# Patient Record
Sex: FEMALE | Race: WHITE
[De-identification: names, ages, dates, MRNs, and addresses within clinical notes are randomized per-mention and may not be internally consistent; named-entity substitution may affect disease eponyms.]

---

## 2019-01-03 ENCOUNTER — HOSPITAL ENCOUNTER (EMERGENCY)
Dept: HOSPITAL 56 - MW.ED | Age: 49
Discharge: HOME | End: 2019-01-03
Payer: MEDICARE

## 2019-01-03 VITALS — DIASTOLIC BLOOD PRESSURE: 95 MMHG | SYSTOLIC BLOOD PRESSURE: 150 MMHG

## 2019-01-03 DIAGNOSIS — Z88.6: ICD-10-CM

## 2019-01-03 DIAGNOSIS — F10.120: Primary | ICD-10-CM

## 2019-01-03 DIAGNOSIS — I10: ICD-10-CM

## 2019-01-03 DIAGNOSIS — E10.9: ICD-10-CM

## 2019-01-03 NOTE — EDM.PDOC
ED HPI GENERAL MEDICAL PROBLEM





- General


Chief Complaint: Drug or Alcohol Abuse


Stated Complaint: AMB.


Time Seen by Provider: 01/03/19 15:32





- History of Present Illness


INITIAL COMMENTS - FREE TEXT/NARRATIVE: 





HISTORY AND PHYSICAL:





History of present illness:


The patient is a 48-year-old female who is a type I diabetic who usually uses 

an insulin pump and comes via EMS after the family called EMS for the patient 

acting not like herself after drinking large amounts of alcohol. There were 

concerned about her slurring of her speech because she is a diabetic and they 

thought her sugar might be out of control. The patient here says she did drink 

large amounts of alcohol which she never does and she did not eat much food 

today. She did remove her insulin pump at the house prior to coming here. Her 

blood sugar by EMS was 170 and here in the ED is 140. The patient denies any 

chest pain shortness of breath recent falls any pain to her extremities abdomen 

and she has no nausea or vomiting. She's not had polyuria or polydipsia and she 

openly admits that she has had high blood sugars in the past with DKA and her 

sugars have been better controlled with the insulin pump. She follows with 

[facility] and has a provider there for follow-up. The patient denies that 

she has an unsafe environment and that she is in any risk but just keeps 

repeating that she drank a lot of alcohol today.





Review of systems: 


As per history of present illness and below otherwise all systems reviewed and 

negative.





Past medical history: 


As per history of present illness and as reviewed below otherwise 

noncontributory.





Surgical history: 


As per history of present illness and as reviewed below otherwise 

noncontributory.





Social history: 


No reported history of drug or alcohol abuse.





Family history: 


As per history of present illness and as reviewed below otherwise 

noncontributory.





Physical exam:


General: Well-developed well-nourished female who is nontoxic and moves all 

extremities. Vital signs noted by me. The patient does have some slurring of 

her speech but is speaking clearly and answering questions appropriately


HEENT: Atraumatic, normocephalic, pupils reactive, sclera are injected, 

negative for conjunctival pallor or scleral icterus, mucous membranes moist, 

throat clear, neck supple, nontender, trachea midline.


Lungs: Clear to auscultation, breath sounds equal bilaterally, chest nontender.


Heart: S1S2, regular rhythm is highly tachycardic rate of my evaluation but no 

overt murmurs


Abdomen: Soft, nondistended, nontender. Negative for masses or 

hepatosplenomegaly. Negative for costovertebral tenderness.


Pelvis: Stable nontender.


Genitourinary: Deferred.


Rectal: Deferred.


Extremities: Atraumatic, negative for cords or calf pain. Neurovascular 

unremarkable. The patient moves all extremities without defects deficits or 

tenderness


Neuro: Awake, alert, oriented. Cranial nerves II through XII unremarkable. 

Cerebellum unremarkable. Motor and sensory unremarkable throughout. Exam 

nonfocal.


Back: There are no midline step-offs tenderness defects the thoracic or lumbar 

spine and no soft tissue injuries are visualized


Skin: I do not see any evidence of any rashes lesions or trauma


Diagnostics:


Accu-Chek





Therapeutics:


[]





I specifically asked the patient a number of times when it is that I can offer 

her here in the ED and she says nothing area her daughter-in-law is at bedside 

and seems to be very attentive to her and also says that she just drank too 

much alcohol and that something she never does and that she is happy to take 

her home. The paramedics were concerned about the home situation as there 

seemed to be some conflicting stories about today's events and we will notify 

police of this information so that they can be in the loop and intervene as 

needed. I specifically asked the patient and daughter-in-law if she feels 

comfortable going back to her house and they said yes. Nursing will notify 

police prior to the patient's discharge


Impression: 


Alcohol intoxication





Definitive disposition and diagnosis as appropriate pending reevaluation and 

review of above.





- Related Data


 Allergies











Allergy/AdvReac Type Severity Reaction Status Date / Time


 


aspirin Allergy  Anaphylactic Verified 01/03/19 15:25





   Shock  











Home Meds: 


 Home Meds





Insulin Aspart [Novolog Flexpen] 4 - 12 unit SQ BEDTIME #2 pen 07/10/15 [Rx]


Insulin Glarg,Human.Rec.Analog [LantUS Solostar] 35 units SUBCUT BEDTIME #1 pen 

07/10/15 [Rx]


Levofloxacin [Levaquin] 500 mg PO Q24H #11 tablet 07/10/15 [Rx]


Lisinopril 20 mg PO DAILY #30 tablet 07/10/15 [Rx]











Past Medical History


HEENT History: Reports: None


Cardiovascular History: Reports: Hypertension


Respiratory History: Reports: None


Gastrointestinal History: Reports: None


Genitourinary History: Reports: Other (See Below)


Other Genitourinary History: Reyes syndrome


OB/GYN History: Reports: None


Musculoskeletal History: Reports: None


Neurological History: Reports: None


Psychiatric History: Reports: None


Endocrine/Metabolic History: Reports: Diabetes, Type II


Hematologic History: Reports: None


Immunologic History: Reports: None


Oncologic (Cancer) History: Reports: Cervix


Dermatologic History: Reports: None





- Past Surgical History


Head Surgeries/Procedures: Reports: None


HEENT Surgical History: Reports: None


Cardiovascular Surgical History: Reports: None


Respiratory Surgical History: Reports: None


GI Surgical History: Reports: None


Female  Surgical History: Reports: None


Neurological Surgical History: Reports: None


Musculoskeletal Surgical History: Reports: None


Oncologic Surgical History: Reports: None


Dermatological Surgical History: Reports: None





Social & Family History





- Family History


Family Medical History: Noncontributory





ED ROS GENERAL





- Review of Systems


Review Of Systems: ROS reveals no pertinent complaints other than HPI.





ED EXAM, GENERAL





- Physical Exam


Exam: See Below (See dictation)





Course





- Vital Signs


Last Recorded V/S: 





 Last Vital Signs











Temp  37.1 C   01/03/19 15:17


 


Pulse  70   01/03/19 15:17


 


Resp  16   01/03/19 15:17


 


BP  188/110 H  01/03/19 15:17


 


Pulse Ox  98   01/03/19 15:17














- Orders/Labs/Meds


Labs: 





 Laboratory Tests











  01/03/19 Range/Units





  15:37 


 


POC Glucose  149 H  ()  mg/dL














Departure





- Departure


Time of Disposition: 15:45


Disposition: Home, Self-Care 01


Condition: Good


Clinical Impression: 


Alcohol intoxication


Qualifiers:


 Complication of substance-induced condition: uncomplicated Qualified Code(s): 

F10.920 - Alcohol use, unspecified with intoxication, uncomplicated





Type 1 diabetes mellitus


Qualifiers:


 Diabetes mellitus complication status: without complication Qualified Code(s): 

E10.9 - Type 1 diabetes mellitus without complications








- Discharge Information


Referrals: 


PCP,Unknown [Primary Care Provider] - 


Additional Instructions: 


The following information is given to patients seen in the emergency department 

who are being discharged to home. This information is to outline your options 

for follow-up care. We provide all patients seen in our emergency department 

with a follow-up referral.





The need for follow-up, as well as the timing and circumstances, are variable 

depending upon the specifics of your emergency department visit.





If you don't have a primary care physician on staff, we will provide you with a 

referral. We always advise you to contact your personal physician following an 

emergency department visit to inform them of the circumstance of the visit and 

for follow-up with them and/or the need for any referrals to a consulting 

specialist.





The emergency department will also refer you to a specialist when appropriate. 

This referral assures that you have the opportunity for followup care with a 

specialist. All of these measure are taken in an effort to provide you with 

optimal care, which includes your followup.





Under all circumstances we always encourage you to contact your private 

physician who remains a resource for coordinating  your care. When calling for 

followup care, please make the office aware that this follow-up is from your 

recent emergency room visit. If for any reason you are refused follow-up, 

please contact the [facility] emergency 

department at [phone] and ask to speak to the emergency department 

charge nurse.





[address]


[place] [address]


[phone]








Please check your blood sugar every 1-2 hours for the next 6 hours before 

reconnecting her insulin pump to assure that her blood sugar doesn't fall 

subacute sleep. Please try to eat some food and push some hydration. Please do 

not drink anymore alcohol. Please schedule a follow-up appointment with your 

provider in the clinic for further care and evaluation and return to ER as 

needed and as discussed

## 2023-09-27 ENCOUNTER — APPOINTMENT (OUTPATIENT)
Dept: CT IMAGING | Facility: CLINIC | Age: 53
End: 2023-09-27
Attending: EMERGENCY MEDICINE
Payer: MEDICARE

## 2023-09-27 ENCOUNTER — NURSE TRIAGE (OUTPATIENT)
Dept: NURSING | Facility: CLINIC | Age: 53
End: 2023-09-27

## 2023-09-27 ENCOUNTER — HOSPITAL ENCOUNTER (EMERGENCY)
Facility: CLINIC | Age: 53
Discharge: LEFT BEFORE DISCHARGE | End: 2023-09-27
Attending: EMERGENCY MEDICINE | Admitting: EMERGENCY MEDICINE
Payer: MEDICARE

## 2023-09-27 VITALS
RESPIRATION RATE: 18 BRPM | DIASTOLIC BLOOD PRESSURE: 90 MMHG | SYSTOLIC BLOOD PRESSURE: 122 MMHG | TEMPERATURE: 97.2 F | OXYGEN SATURATION: 99 % | HEART RATE: 124 BPM

## 2023-09-27 DIAGNOSIS — R10.9 ABDOMINAL PAIN, UNSPECIFIED ABDOMINAL LOCATION: ICD-10-CM

## 2023-09-27 LAB
ALBUMIN SERPL BCG-MCNC: 4 G/DL (ref 3.5–5.2)
ALBUMIN SERPL BCG-MCNC: 4.2 G/DL (ref 3.5–5.2)
ALBUMIN SERPL BCG-MCNC: 4.5 G/DL (ref 3.5–5.2)
ALBUMIN UR-MCNC: 200 MG/DL
ALP SERPL-CCNC: 121 U/L (ref 35–104)
ALP SERPL-CCNC: ABNORMAL U/L
ALP SERPL-CCNC: ABNORMAL U/L
ALT SERPL W P-5'-P-CCNC: 21 U/L (ref 0–50)
ALT SERPL W P-5'-P-CCNC: ABNORMAL U/L
ALT SERPL W P-5'-P-CCNC: ABNORMAL U/L
ANION GAP SERPL CALCULATED.3IONS-SCNC: 10 MMOL/L (ref 7–15)
ANION GAP SERPL CALCULATED.3IONS-SCNC: 13 MMOL/L (ref 7–15)
ANION GAP SERPL CALCULATED.3IONS-SCNC: 14 MMOL/L (ref 7–15)
APPEARANCE UR: ABNORMAL
AST SERPL W P-5'-P-CCNC: 19 U/L (ref 0–45)
AST SERPL W P-5'-P-CCNC: ABNORMAL U/L
AST SERPL W P-5'-P-CCNC: ABNORMAL U/L
BASOPHILS # BLD AUTO: 0 10E3/UL (ref 0–0.2)
BASOPHILS NFR BLD AUTO: 0 %
BILIRUB SERPL-MCNC: 0.3 MG/DL
BILIRUB SERPL-MCNC: 0.3 MG/DL
BILIRUB SERPL-MCNC: 0.4 MG/DL
BILIRUB UR QL STRIP: NEGATIVE
BUN SERPL-MCNC: 28 MG/DL (ref 6–20)
BUN SERPL-MCNC: 28.6 MG/DL (ref 6–20)
BUN SERPL-MCNC: 28.7 MG/DL (ref 6–20)
CALCIUM SERPL-MCNC: 9 MG/DL (ref 8.6–10)
CALCIUM SERPL-MCNC: 9.1 MG/DL (ref 8.6–10)
CALCIUM SERPL-MCNC: 9.3 MG/DL (ref 8.6–10)
CHLORIDE SERPL-SCNC: 95 MMOL/L (ref 98–107)
CHLORIDE SERPL-SCNC: 96 MMOL/L (ref 98–107)
CHLORIDE SERPL-SCNC: 96 MMOL/L (ref 98–107)
COLOR UR AUTO: ABNORMAL
CREAT SERPL-MCNC: 0.81 MG/DL (ref 0.51–0.95)
CREAT SERPL-MCNC: 0.83 MG/DL (ref 0.51–0.95)
CREAT SERPL-MCNC: 0.86 MG/DL (ref 0.51–0.95)
DEPRECATED HCO3 PLAS-SCNC: 23 MMOL/L (ref 22–29)
DEPRECATED HCO3 PLAS-SCNC: 24 MMOL/L (ref 22–29)
DEPRECATED HCO3 PLAS-SCNC: 24 MMOL/L (ref 22–29)
EGFRCR SERPLBLD CKD-EPI 2021: 81 ML/MIN/1.73M2
EGFRCR SERPLBLD CKD-EPI 2021: 84 ML/MIN/1.73M2
EGFRCR SERPLBLD CKD-EPI 2021: 87 ML/MIN/1.73M2
EOSINOPHIL # BLD AUTO: 0.1 10E3/UL (ref 0–0.7)
EOSINOPHIL NFR BLD AUTO: 1 %
ERYTHROCYTE [DISTWIDTH] IN BLOOD BY AUTOMATED COUNT: 11.5 % (ref 10–15)
GLUCOSE SERPL-MCNC: 208 MG/DL (ref 70–99)
GLUCOSE SERPL-MCNC: 215 MG/DL (ref 70–99)
GLUCOSE SERPL-MCNC: 215 MG/DL (ref 70–99)
GLUCOSE UR STRIP-MCNC: 30 MG/DL
HCT VFR BLD AUTO: 47.6 % (ref 35–47)
HGB BLD-MCNC: 16 G/DL (ref 11.7–15.7)
HGB UR QL STRIP: ABNORMAL
HOLD SPECIMEN: NORMAL
HOLD SPECIMEN: NORMAL
IMM GRANULOCYTES # BLD: 0 10E3/UL
IMM GRANULOCYTES NFR BLD: 0 %
KETONES UR STRIP-MCNC: 20 MG/DL
LEUKOCYTE ESTERASE UR QL STRIP: ABNORMAL
LYMPHOCYTES # BLD AUTO: 2.1 10E3/UL (ref 0.8–5.3)
LYMPHOCYTES NFR BLD AUTO: 21 %
MCH RBC QN AUTO: 30.5 PG (ref 26.5–33)
MCHC RBC AUTO-ENTMCNC: 33.6 G/DL (ref 31.5–36.5)
MCV RBC AUTO: 91 FL (ref 78–100)
MONOCYTES # BLD AUTO: 0.9 10E3/UL (ref 0–1.3)
MONOCYTES NFR BLD AUTO: 9 %
NEUTROPHILS # BLD AUTO: 6.7 10E3/UL (ref 1.6–8.3)
NEUTROPHILS NFR BLD AUTO: 69 %
NITRATE UR QL: NEGATIVE
NRBC # BLD AUTO: 0 10E3/UL
NRBC BLD AUTO-RTO: 0 /100
PH UR STRIP: 6 [PH] (ref 5–7)
PLATELET # BLD AUTO: 348 10E3/UL (ref 150–450)
POTASSIUM SERPL-SCNC: 4.7 MMOL/L (ref 3.4–5.3)
POTASSIUM SERPL-SCNC: 5.8 MMOL/L (ref 3.4–5.3)
POTASSIUM SERPL-SCNC: 7.4 MMOL/L (ref 3.4–5.3)
PROT SERPL-MCNC: 7.1 G/DL (ref 6.4–8.3)
PROT SERPL-MCNC: 7.1 G/DL (ref 6.4–8.3)
PROT SERPL-MCNC: 7.5 G/DL (ref 6.4–8.3)
RBC # BLD AUTO: 5.24 10E6/UL (ref 3.8–5.2)
RBC URINE: >182 /HPF
SODIUM SERPL-SCNC: 130 MMOL/L (ref 135–145)
SODIUM SERPL-SCNC: 132 MMOL/L (ref 135–145)
SODIUM SERPL-SCNC: 133 MMOL/L (ref 135–145)
SP GR UR STRIP: 1.02 (ref 1–1.03)
UROBILINOGEN UR STRIP-MCNC: NORMAL MG/DL
WBC # BLD AUTO: 9.7 10E3/UL (ref 4–11)
WBC CLUMPS #/AREA URNS HPF: PRESENT /HPF
WBC URINE: >182 /HPF
YEAST #/AREA URNS HPF: ABNORMAL /HPF

## 2023-09-27 PROCEDURE — 96376 TX/PRO/DX INJ SAME DRUG ADON: CPT

## 2023-09-27 PROCEDURE — 84155 ASSAY OF PROTEIN SERUM: CPT | Mod: 91 | Performed by: EMERGENCY MEDICINE

## 2023-09-27 PROCEDURE — 250N000011 HC RX IP 250 OP 636: Mod: JZ | Performed by: EMERGENCY MEDICINE

## 2023-09-27 PROCEDURE — 36415 COLL VENOUS BLD VENIPUNCTURE: CPT | Performed by: EMERGENCY MEDICINE

## 2023-09-27 PROCEDURE — 81001 URINALYSIS AUTO W/SCOPE: CPT | Performed by: EMERGENCY MEDICINE

## 2023-09-27 PROCEDURE — 84155 ASSAY OF PROTEIN SERUM: CPT | Performed by: EMERGENCY MEDICINE

## 2023-09-27 PROCEDURE — 96374 THER/PROPH/DIAG INJ IV PUSH: CPT

## 2023-09-27 PROCEDURE — 80053 COMPREHEN METABOLIC PANEL: CPT | Performed by: EMERGENCY MEDICINE

## 2023-09-27 PROCEDURE — 258N000003 HC RX IP 258 OP 636: Performed by: EMERGENCY MEDICINE

## 2023-09-27 PROCEDURE — 87086 URINE CULTURE/COLONY COUNT: CPT | Performed by: EMERGENCY MEDICINE

## 2023-09-27 PROCEDURE — 85025 COMPLETE CBC W/AUTO DIFF WBC: CPT | Performed by: EMERGENCY MEDICINE

## 2023-09-27 PROCEDURE — 99285 EMERGENCY DEPT VISIT HI MDM: CPT | Mod: 25

## 2023-09-27 PROCEDURE — 85004 AUTOMATED DIFF WBC COUNT: CPT | Performed by: EMERGENCY MEDICINE

## 2023-09-27 PROCEDURE — 74176 CT ABD & PELVIS W/O CONTRAST: CPT | Mod: MG

## 2023-09-27 RX ORDER — ACETAMINOPHEN 500 MG
1000 TABLET ORAL ONCE
Status: DISCONTINUED | OUTPATIENT
Start: 2023-09-27 | End: 2023-09-27

## 2023-09-27 RX ORDER — ONDANSETRON 2 MG/ML
4 INJECTION INTRAMUSCULAR; INTRAVENOUS ONCE
Status: COMPLETED | OUTPATIENT
Start: 2023-09-27 | End: 2023-09-27

## 2023-09-27 RX ORDER — CEFDINIR 300 MG/1
300 CAPSULE ORAL 2 TIMES DAILY
Qty: 20 CAPSULE | Refills: 0 | Status: SHIPPED | OUTPATIENT
Start: 2023-09-27 | End: 2023-10-07

## 2023-09-27 RX ADMIN — ONDANSETRON 4 MG: 2 INJECTION INTRAMUSCULAR; INTRAVENOUS at 21:35

## 2023-09-27 RX ADMIN — SODIUM CHLORIDE 1000 ML: 9 INJECTION, SOLUTION INTRAVENOUS at 22:03

## 2023-09-27 RX ADMIN — ONDANSETRON 4 MG: 2 INJECTION INTRAMUSCULAR; INTRAVENOUS at 18:25

## 2023-09-27 ASSESSMENT — ACTIVITIES OF DAILY LIVING (ADL)
ADLS_ACUITY_SCORE: 35
ADLS_ACUITY_SCORE: 33

## 2023-09-27 NOTE — TELEPHONE ENCOUNTER
Pain 1130  1230    Nurse Triage SBAR    Is this a 2nd Level Triage? NO    Situation: Pt calling with concerns for flank pain.    Background: Pt states she had stents put in on Friday for kidney stones. Today, they were removed around 1130.    Assessment: Pain has been a 10/10 since 1230 despite Tramadol.     Protocol Recommended Disposition:   Go to ED Now    Recommendation: Dispo to go to ER. Pt informed of closest location.     Reason for Disposition   [1] SEVERE pain (e.g., excruciating, scale 8-10) AND [2] present > 1 hour   [1] SEVERE pain (e.g., excruciating, scale 8-10) AND [2] not improved after pain medicine    Additional Information   Negative: Passed out (i.e., lost consciousness, collapsed and was not responding)   Negative: Shock suspected (e.g., cold/pale/clammy skin, too weak to stand, low BP, rapid pulse)   Negative: Difficult to awaken or acting confused (e.g., disoriented, slurred speech)   Negative: Sounds like a life-threatening emergency to the triager   Negative: [1] Sudden onset of severe flank pain AND [2] age > 60 years   Negative: [1] Abdominal pain AND [2] age > 60 years    Protocols used: Flank Pain-CEASAR-TURNER Campbell RN  [facility] Nurse Advisor   9/27/2023  5:37 PM

## 2023-09-27 NOTE — ED TRIAGE NOTES
Presents to triage with c/o R flank and R sided abdominal pain. Patient had previous kidney stones and had stents in bilateral kidneys that were removed today. Since that time patient has had severe R flank and abdominal pain and n/v

## 2023-09-28 NOTE — DISCHARGE INSTRUCTIONS
Please follow up with your urologist asap  Your urine is pending. Please check your MyChart for results

## 2023-09-28 NOTE — ED NOTES
PIT/Triage Evaluation    Patient presented with post operation problem. She reports that she got a kidney stone removal and bilateral stents placed on 9/22/23. Today she had stents removed at 1130. She has been in pain after an hour and been crying. The pain is on her right flank.  No pain on the left side.  She reports vomiting and nausea. Denies fever.    Exam is notable for:  General: No distress  Abdomen: Soft, non-tender  Respiratory: No tachypnea  Cardiovascular: Equal pulses, brisk cap refill  Extremities: Moving all extremities      Appropriate interventions for symptom management were initiated if applicable.  Appropriate diagnostic tests were initiated if indicated.    Important information for subsequent clinician:  Bilateral ureteral stents pulled today.  Labs and imaging are ordered.    I briefly evaluated the patient and developed an initial plan of care. I discussed this plan and explained that this brief interaction does not constitute a full evaluation. Patient/family understands that they should wait to be fully evaluated and discuss any test results with another clinician prior to leaving the hospital.       Manju Xie MD  09/27/23 [id number]

## 2023-09-28 NOTE — ED PROVIDER NOTES
History     Chief Complaint:  Post-op Problem    The history is provided by the patient.      Jaylene Dougherty is a 53 year old female with S/P bilateral kidney stone removal who presents to the ED for evaluation of post-op pain. Jaylene reports she had bilateral ureteral stents removed today at 1130 at [facility].  She states right-sided flank and abdominal pain onset about 30 minutes later. She endorses nausea, vomiting, and urinary frequency. She was told by her urologist  that the pain won't be severe, but she states the pain has been terrible since the procedure. She denies any hematuria, fever, or urgency.    Independent Historian:   None - Patient Only    Review of External Notes:   I reviewed the patient's nephrolithotomy note from 9/22/23 with Dr. Manzanares at [place].    Medications:    Coreg  Cipro  Insulin  Prilosec  Crestor  Prinivil    Past Medical History:    Type 1 diabetes mellitus with diabetic neuropathy, with long-term current use of insulin  Chronic UTI  Migraine  Chronic shoulder pain  Iron deficiency anemia  Insomnia  Recurrent pyelonephritis  Bilateral nephrolithiasis  Hypercholesterolemia  Hypertension  MDD  YUKO  Hiatal hernia    Past Surgical History:    Gastric bypass  Tubal ligation  Lap cholecystectomy   Cystoscopy, bilateral ureteroscopy, left ureteral laser lithotripsy, bilateral ureteral stent placement, bilateral retrograde pyelogram    Physical Exam   Patient Vitals for the past 24 hrs:   BP Temp Temp src Pulse Resp SpO2   09/27/23 2200 (!) 122/90 -- -- (!) 124 -- 99 %   09/27/23 2130 127/88 -- -- 120 -- 98 %   09/27/23 2129 -- -- -- -- -- 99 %   09/27/23 2128 -- -- -- -- -- 100 %   09/27/23 2126 118/87 -- -- -- -- --   09/27/23 1815 105/72 97.2  F (36.2  C) Temporal 112 18 99 %     Physical Exam    Patient declined exam  Emergency Department Course     Imaging:  CT Abdomen Pelvis w/o Contrast   Final Result   IMPRESSION:    1.  Two small bilateral  nonobstructing renal stones. No ureteric stones. There is urothelial thickening in the ureters bilaterally as well as renal pelves suggesting ascending infection pyelitis. Mild soft tissue stranding adjacent to the lower pole of    each kidney.      2.  Air in the urinary bladder presumably due to recent instrumentation.      3.  Normal appendix. Previous Seema-en-Y gastric bypass.            Report per radiology    Laboratory:  Labs Ordered and Resulted from Time of ED Arrival to Time of ED Departure   COMPREHENSIVE METABOLIC PANEL - Abnormal       Result Value    Sodium 132 (*)     Potassium 5.8 (*)     Carbon Dioxide (CO2) 23      Anion Gap 14      Urea Nitrogen 28.0 (*)     Creatinine 0.83      GFR Estimate 84      Calcium 9.1      Chloride 95 (*)     Glucose 208 (*)     Alkaline Phosphatase        AST        ALT        Protein Total 7.1      Albumin 4.2      Bilirubin Total 0.3     CBC WITH PLATELETS AND DIFFERENTIAL - Abnormal    WBC Count 9.7      RBC Count 5.24 (*)     Hemoglobin 16.0 (*)     Hematocrit 47.6 (*)     MCV 91      MCH 30.5      MCHC 33.6      RDW 11.5      Platelet Count 348      % Neutrophils 69      % Lymphocytes 21      % Monocytes 9      % Eosinophils 1      % Basophils 0      % Immature Granulocytes 0      NRBCs per 100 WBC 0      Absolute Neutrophils 6.7      Absolute Lymphocytes 2.1      Absolute Monocytes 0.9      Absolute Eosinophils 0.1      Absolute Basophils 0.0      Absolute Immature Granulocytes 0.0      Absolute NRBCs 0.0     COMPREHENSIVE METABOLIC PANEL - Abnormal    Sodium 130 (*)     Potassium 7.4 (*)     Carbon Dioxide (CO2) 24      Anion Gap 10      Urea Nitrogen 28.6 (*)     Creatinine 0.81      GFR Estimate 87      Calcium 9.0      Chloride 96 (*)     Glucose 215 (*)     Alkaline Phosphatase        AST        ALT        Protein Total 7.5      Albumin 4.0      Bilirubin Total 0.4     COMPREHENSIVE METABOLIC PANEL - Abnormal    Sodium 133 (*)     Potassium 4.7      Carbon  Dioxide (CO2) 24      Anion Gap 13      Urea Nitrogen 28.7 (*)     Creatinine 0.86      GFR Estimate 81      Calcium 9.3      Chloride 96 (*)     Glucose 215 (*)     Alkaline Phosphatase 121 (*)     AST 19      ALT 21      Protein Total 7.1      Albumin 4.5      Bilirubin Total 0.3     ROUTINE UA WITH MICROSCOPIC REFLEX TO CULTURE     Procedures   None    Emergency Department Course & Assessments:    Interventions:  Medications   ondansetron (ZOFRAN) injection 4 mg (4 mg Intravenous $Given 9/27/23 1825)   ondansetron (ZOFRAN) injection 4 mg (4 mg Intravenous $Given 9/27/23 2135)   sodium chloride 0.9% BOLUS 1,000 mL (0 mLs Intravenous Stopped 9/27/23 2229)   HYDROmorphone (DILAUDID) injection 1 mg (0 mg Intravenous Return to Cabinet 9/27/23 2230)     Assessments:  2205 I obtained history as noted above. The patient denies exam and is requesting to leave AMA due to long wait time. She will provide urine for UA and go home. She will check her MyChart for results of her UA.     Independent Interpretation (X-rays, CTs, rhythm strip):  None    Consultations/Discussion of Management or Tests:  None    Social Determinants of Health affecting care:   None    Disposition:  The patient left AMA.     Impression & Plan      Medical Decision Making:  Upon initial evaluation, patient was requesting discharge given the long wait time.  I reviewed the results with her but she did not want to wait for the urinalysis.  Given that she is having severe pain and tachycardia, she did agree to leave a urine sample but did not want to wait for IV pain medication with the results.  She is aware that this could be an infection given the CT appearance.  She told us the name of the pharmacy and I agreed to send a prescription to the pharmacy showed infection shows up on the urine.  She voiced understanding she needs to follow-up with urologist ASAP.  She declined any further intervention at this point.  I discussed with her that she needs to  return if symptoms worsen or if she has fever.  She is discharged at her request without results.  Her urinalysis did come back showing signs of infection.  I did send an electronic prescription of cefdinir.  I attempted to call the patient with the results but the call went to her voicemail.    Diagnosis:    ICD-10-CM    1. Abdominal pain, unspecified abdominal location  R10.9            Discharge Medications:  There are no discharge medications for this patient.      Scribe Disclosure:  I, Va Rodriguez, am serving as a scribe at 10:09 PM on 9/27/2023 to document services personally performed by Mohini Schuster MD based on my observations and the provider's statements to me.   9/27/2023   Mohini Schuster MD Cheng, Wenlan, MD  09/27/23 [id number]

## 2023-09-29 LAB — BACTERIA UR CULT: NORMAL

## 2025-01-27 ENCOUNTER — MEDICAL CORRESPONDENCE (OUTPATIENT)
Dept: HEALTH INFORMATION MANAGEMENT | Facility: CLINIC | Age: 55
End: 2025-01-27
Payer: MEDICARE

## 2025-01-27 ENCOUNTER — TRANSFERRED RECORDS (OUTPATIENT)
Dept: HEALTH INFORMATION MANAGEMENT | Facility: CLINIC | Age: 55
End: 2025-01-27
Payer: MEDICARE

## 2025-01-30 ENCOUNTER — TRANSFERRED RECORDS (OUTPATIENT)
Dept: HEALTH INFORMATION MANAGEMENT | Facility: CLINIC | Age: 55
End: 2025-01-30

## 2025-02-03 ENCOUNTER — TRANSCRIBE ORDERS (OUTPATIENT)
Dept: OTHER | Age: 55
End: 2025-02-03

## 2025-02-03 DIAGNOSIS — N20.0 CALCULUS OF KIDNEY: Primary | ICD-10-CM

## 2025-02-03 DIAGNOSIS — R31.9 HEMATURIA, UNSPECIFIED: ICD-10-CM

## 2025-04-16 ENCOUNTER — VIRTUAL VISIT (OUTPATIENT)
Dept: UROLOGY | Facility: CLINIC | Age: 55
End: 2025-04-16
Payer: MEDICARE

## 2025-04-16 DIAGNOSIS — N20.0 CALCULUS OF KIDNEY: ICD-10-CM

## 2025-04-16 DIAGNOSIS — Z98.84 HISTORY OF ROUX-EN-Y GASTRIC BYPASS: ICD-10-CM

## 2025-04-16 DIAGNOSIS — R31.0 GROSS HEMATURIA: Primary | ICD-10-CM

## 2025-04-16 DIAGNOSIS — N39.0 RECURRENT UTI: ICD-10-CM

## 2025-04-16 PROCEDURE — 1125F AMNT PAIN NOTED PAIN PRSNT: CPT | Mod: 95 | Performed by: STUDENT IN AN ORGANIZED HEALTH CARE EDUCATION/TRAINING PROGRAM

## 2025-04-16 PROCEDURE — 98002 SYNCH AUDIO-VIDEO NEW MOD 45: CPT | Performed by: STUDENT IN AN ORGANIZED HEALTH CARE EDUCATION/TRAINING PROGRAM

## 2025-04-16 RX ORDER — PROCHLORPERAZINE 25 MG/1
SUPPOSITORY RECTAL SEE ADMIN INSTRUCTIONS
COMMUNITY
Start: 2025-01-13

## 2025-04-16 RX ORDER — CYCLOBENZAPRINE HCL 10 MG
10 TABLET ORAL 3 TIMES DAILY PRN
COMMUNITY
Start: 2025-01-07

## 2025-04-16 RX ORDER — ZOLPIDEM TARTRATE 10 MG/1
10 TABLET ORAL
COMMUNITY

## 2025-04-16 RX ORDER — TRAMADOL HYDROCHLORIDE 50 MG/1
TABLET ORAL
COMMUNITY

## 2025-04-16 RX ORDER — ACETAMINOPHEN AND CODEINE PHOSPHATE 300; 30 MG/1; MG/1
TABLET ORAL
COMMUNITY
Start: 2024-09-17

## 2025-04-16 RX ORDER — INSULIN ASPART 100 [IU]/ML
INJECTION, SOLUTION INTRAVENOUS; SUBCUTANEOUS
COMMUNITY
Start: 2025-04-14

## 2025-04-16 RX ORDER — OMEPRAZOLE 20 MG/1
40 CAPSULE, DELAYED RELEASE ORAL DAILY
COMMUNITY
Start: 2025-03-06

## 2025-04-16 RX ORDER — CIPROFLOXACIN 500 MG/1
TABLET, FILM COATED ORAL
COMMUNITY
Start: 2025-04-15

## 2025-04-16 RX ORDER — ROSUVASTATIN CALCIUM 20 MG/1
20 TABLET, COATED ORAL DAILY
COMMUNITY

## 2025-04-16 RX ORDER — PROCHLORPERAZINE 25 MG/1
SUPPOSITORY RECTAL SEE ADMIN INSTRUCTIONS
COMMUNITY
Start: 2025-03-16

## 2025-04-16 RX ORDER — NORTRIPTYLINE HYDROCHLORIDE 25 MG/1
CAPSULE ORAL
COMMUNITY

## 2025-04-16 RX ORDER — LISINOPRIL 10 MG/1
TABLET ORAL
COMMUNITY

## 2025-04-16 RX ORDER — CARVEDILOL 6.25 MG/1
TABLET ORAL
COMMUNITY

## 2025-04-16 RX ORDER — INSULIN PMP CART,AUT,G6/7,CNTR
EACH SUBCUTANEOUS
COMMUNITY
Start: 2025-03-29

## 2025-04-16 RX ORDER — ACETAMINOPHEN 325 MG/1
975 TABLET ORAL ONCE
OUTPATIENT
Start: 2025-04-16 | End: 2025-04-16

## 2025-04-16 RX ORDER — FERROUS SULFATE 325(65) MG
1 TABLET ORAL
COMMUNITY
Start: 2024-05-10

## 2025-04-16 ASSESSMENT — PAIN SCALES - GENERAL: PAINLEVEL_OUTOF10: MODERATE PAIN (5)

## 2025-04-16 NOTE — LETTER
"4/16/2025       RE: Jaylene Dougherty  [address] [facility] [address]     Dear Colleague,    Thank you for referring your patient, Jaylene Dougherty, to the [facility] [place] at [facility]. Please see a copy of my visit note below.    [facility]  Main Office: [address]  [address]  [place] [address]       CHIEF COMPLAINT:  Kidney stone    HISTORY:   53 yo w/ h/o RYGB, recurrent UTI, nephrolithiasis    Has had ongoing intermittent gross hematuria for the past ~6 months    She had a bilateral ureteroscopy with laser lithotripsy and stone basketing in 2023 and she said that was a \"terrible experience\" and she had sworn never to do it again. She had severe stent pain and irritation and apparently had a urinary tract infection afterwards     Patient says she has another urinary tract infection and is being treated with her PCP office    She drinks a large amount of water a day (9 bottles x 12 oz a day)    Non smoker    PAST MEDICAL HISTORY: No past medical history on file.    PAST SURGICAL HISTORY: No past surgical history on file.    FAMILY HISTORY: No family history on file.    SOCIAL HISTORY:   Social History     Tobacco Use     Smoking status: Never     Passive exposure: Past     Smokeless tobacco: Never   Substance Use Topics     Alcohol use: Not on file          Allergies   Allergen Reactions     Asa [Aspirin]      Nsaids          Current Outpatient Medications:      acetaminophen-codeine (TYLENOL #3) 300-30 MG per tablet, TAKE ONE TABLET EVERY 4-6 HOURS AS NEEDED FOR PAIN, Disp: , Rfl:      ciprofloxacin (CIPRO) 500 MG tablet, , Disp: , Rfl:      Continuous Glucose Sensor (DEXCOM G6 SENSOR) MISC, See Admin Instructions., Disp: , Rfl:      Continuous Glucose Transmitter (DEXCOM G6 TRANSMITTER) MISC, See Admin Instructions., Disp: , Rfl:      cyclobenzaprine (FLEXERIL) 10 MG tablet, Take 10 mg by mouth 3 times daily as needed for " muscle spasms., Disp: , Rfl:      FEROSUL 325 (65 Fe) MG tablet, Take 1 tablet by mouth daily at 2 pm., Disp: , Rfl:      Insulin Disposable Pump (OMNIPOD 5 PODS, GEN 5,) MISC, CHANGE EVERY 2 DAYS, Disp: , Rfl:      NOVOLOG VIAL 100 UNIT/ML soln, , Disp: , Rfl:      omeprazole (PRILOSEC) 20 MG DR capsule, Take 40 mg by mouth daily., Disp: , Rfl:      carvedilol (COREG) 6.25 MG tablet, 6.25mg 1/day, Disp: , Rfl:      Ferrous Sulfate (IRON PO), Take by mouth., Disp: , Rfl:      lisinopril (ZESTRIL) 10 MG tablet, TAKE 1 TO 2 TABLETS BY MOUTH EVERY DAY., Disp: , Rfl:      Multiple Vitamins-Minerals (MULTI VITAMIN  /MINERALS) TABS, Take 1 tablet by mouth., Disp: , Rfl:      nortriptyline (PAMELOR) 25 MG capsule, TAKE 1-2 CAPSULES BY MOUTH EVERY NIGHT AT BEDTIME., Disp: , Rfl:      rosuvastatin (CRESTOR) 20 MG tablet, Take 20 mg by mouth daily., Disp: , Rfl:      traMADol (ULTRAM) 50 MG tablet, take 2 tablets by mouth every 8 hours as needed for pain, Disp: , Rfl:      zolpidem (AMBIEN) 10 MG tablet, Take 10 mg by mouth., Disp: , Rfl:     Review Of Systems:  Skin: No rash, pruritis, or skin pigmentation  Eyes: No changes in vision  Ears/Nose/Throat: No changes in hearing, no nosebleeds  Respiratory: No shortness of breath, dyspnea on exertion, cough, or hemoptysis  Cardiovascular: No chest pain or palpitations  Gastrointestinal: No diarrhea or constipation. No abdominal pain. No hematochezia  Genitourinary: see HPI  Musculoskeletal: No pain or swelling of joints, normal range of motion  Neurologic: No weakness or tremors  Psychiatric: No recent changes in memory or mood  Hematologic/Lymphatic/Immunologic: No easy bruising or enlarged lymph nodes  Endocrine: No weight gain or loss      PHYSICAL EXAM:    There were no vitals taken for this visit.  General appearance: In NAD, conversant  HEENT: Normocephalic and atraumatic, anicteric sclera  Cardiovascular: Not examined  Respiratory: normal, non-labored  breathing  Gastrointestinal: negative, Abdomen soft, non-tender, and non-distended.   Musculoskeletal: Not Examined  Peripheral Vascular/extremity: No peripheral edema  Skin: Normal temperature, turgor, and texture. No rash  Psychiatric: Appropriate affect, alert and oriented to person, place, and time        Cystoscopy: Not done    UA RESULTS:  Recent Labs   Lab Test 09/27/23  2212   COLOR Orange*   APPEARANCE Cloudy*   URINEGLC 30*   URINEBILI Negative   URINEKETONE 20*   SG 1.020   UBLD Large*   URINEPH 6.0   PROTEIN 200*   NITRITE Negative   LEUKEST Moderate*   RBCU >182*   WBCU >182*       Bladder Scan:     Other Labs:      Imaging Studies: ct       Independently interpreted imaging and discussed with patient. 8 mm right lower pole stone. 5 mm right upper pole stone          CLINICAL IMPRESSION:   53 yo w/ h/o RYGB, recurrent UTI, nephrolithiasis    We discussed stone prevention diet in setting of RYGB which likely disrupts her oxalate metabolism. She drinks over 3L of water a day which is good. She needs to work on adhering to low oxalate diet    Re: kidney stones, no significant symptom aside from presumably being the source of gross hematuria. Had a bad experience with last ureteroscopy so I initially discussed ESWL, but there is a significantly lower stone free rate than repeat ureteroscopy. Thus I discussed ureteroscopic management with laser lithotripsy and stone basketing with stent placement. Risks including need for secondary procedure, infection, ureteral injury, incomplete stone clearance, stent pain and irritation were discussed. She agrees to proceed    At time of ureteroscopy will assess bladder to make sure there is no other concerning source of gross hematuria    Re: recurrent UTI, recently treated by her PCP, need to get urine culture results (currently on cipro).  Would likely benefit from starting vaginal estrogen in the future      PLAN:   Schedule right ureteroscopy with laser lithotripsy  and stone basketing, right retrograde pyelogram, right ureteral stent placement    Curtis Gillis MD   [facility] [facility]  [phone] clinic phone        Virtual Visit Details    Type of service:  Video Visit   Video Start Time: 11:42am  Video End Time:11:56am    Originating Location (pt. Location): Home    Distant Location (provider location):  On-site  Platform used for Video Visit: [facility]      Again, thank you for allowing me to participate in the care of your patient.      Sincerely,    Curtis Gillis MD

## 2025-04-16 NOTE — PROGRESS NOTES
"[facility]  Main Office: [address] [facility]  [address]  [place] [address]       CHIEF COMPLAINT:  Kidney stone    HISTORY:   53 yo w/ h/o RYGB, recurrent UTI, nephrolithiasis    Has had ongoing intermittent gross hematuria for the past ~6 months    She had a bilateral ureteroscopy with laser lithotripsy and stone basketing in 2023 and she said that was a \"terrible experience\" and she had sworn never to do it again. She had severe stent pain and irritation and apparently had a urinary tract infection afterwards     Patient says she has another urinary tract infection and is being treated with her PCP office    She drinks a large amount of water a day (9 bottles x 12 oz a day)    Non smoker    PAST MEDICAL HISTORY: No past medical history on file.    PAST SURGICAL HISTORY: No past surgical history on file.    FAMILY HISTORY: No family history on file.    SOCIAL HISTORY:   Social History     Tobacco Use    Smoking status: Never     Passive exposure: Past    Smokeless tobacco: Never   Substance Use Topics    Alcohol use: Not on file          Allergies   Allergen Reactions    Asa [Aspirin]     Nsaids          Current Outpatient Medications:     acetaminophen-codeine (TYLENOL #3) 300-30 MG per tablet, TAKE ONE TABLET EVERY 4-6 HOURS AS NEEDED FOR PAIN, Disp: , Rfl:     ciprofloxacin (CIPRO) 500 MG tablet, , Disp: , Rfl:     Continuous Glucose Sensor (DEXCOM G6 SENSOR) MISC, See Admin Instructions., Disp: , Rfl:     Continuous Glucose Transmitter (DEXCOM G6 TRANSMITTER) MISC, See Admin Instructions., Disp: , Rfl:     cyclobenzaprine (FLEXERIL) 10 MG tablet, Take 10 mg by mouth 3 times daily as needed for muscle spasms., Disp: , Rfl:     FEROSUL 325 (65 Fe) MG tablet, Take 1 tablet by mouth daily at 2 pm., Disp: , Rfl:     Insulin Disposable Pump (OMNIPOD 5 PODS, GEN 5,) MISC, CHANGE EVERY 2 DAYS, Disp: , Rfl:     NOVOLOG VIAL 100 UNIT/ML soln, , Disp: , Rfl:     omeprazole (PRILOSEC) 20 MG DR capsule, Take 40 mg " by mouth daily., Disp: , Rfl:     carvedilol (COREG) 6.25 MG tablet, 6.25mg 1/day, Disp: , Rfl:     Ferrous Sulfate (IRON PO), Take by mouth., Disp: , Rfl:     lisinopril (ZESTRIL) 10 MG tablet, TAKE 1 TO 2 TABLETS BY MOUTH EVERY DAY., Disp: , Rfl:     Multiple Vitamins-Minerals (MULTI VITAMIN  /MINERALS) TABS, Take 1 tablet by mouth., Disp: , Rfl:     nortriptyline (PAMELOR) 25 MG capsule, TAKE 1-2 CAPSULES BY MOUTH EVERY NIGHT AT BEDTIME., Disp: , Rfl:     rosuvastatin (CRESTOR) 20 MG tablet, Take 20 mg by mouth daily., Disp: , Rfl:     traMADol (ULTRAM) 50 MG tablet, take 2 tablets by mouth every 8 hours as needed for pain, Disp: , Rfl:     zolpidem (AMBIEN) 10 MG tablet, Take 10 mg by mouth., Disp: , Rfl:     Review Of Systems:  Skin: No rash, pruritis, or skin pigmentation  Eyes: No changes in vision  Ears/Nose/Throat: No changes in hearing, no nosebleeds  Respiratory: No shortness of breath, dyspnea on exertion, cough, or hemoptysis  Cardiovascular: No chest pain or palpitations  Gastrointestinal: No diarrhea or constipation. No abdominal pain. No hematochezia  Genitourinary: see HPI  Musculoskeletal: No pain or swelling of joints, normal range of motion  Neurologic: No weakness or tremors  Psychiatric: No recent changes in memory or mood  Hematologic/Lymphatic/Immunologic: No easy bruising or enlarged lymph nodes  Endocrine: No weight gain or loss      PHYSICAL EXAM:    There were no vitals taken for this visit.  General appearance: In NAD, conversant  HEENT: Normocephalic and atraumatic, anicteric sclera  Cardiovascular: Not examined  Respiratory: normal, non-labored breathing  Gastrointestinal: negative, Abdomen soft, non-tender, and non-distended.   Musculoskeletal: Not Examined  Peripheral Vascular/extremity: No peripheral edema  Skin: Normal temperature, turgor, and texture. No rash  Psychiatric: Appropriate affect, alert and oriented to person, place, and time        Cystoscopy: Not done    UA  RESULTS:  Recent Labs   Lab Test 09/27/23  2212   COLOR Orange*   APPEARANCE Cloudy*   URINEGLC 30*   URINEBILI Negative   URINEKETONE 20*   SG 1.020   UBLD Large*   URINEPH 6.0   PROTEIN 200*   NITRITE Negative   LEUKEST Moderate*   RBCU >182*   WBCU >182*       Bladder Scan:     Other Labs:      Imaging Studies: ct       Independently interpreted imaging and discussed with patient. 8 mm right lower pole stone. 5 mm right upper pole stone          CLINICAL IMPRESSION:   53 yo w/ h/o RYGB, recurrent UTI, nephrolithiasis    We discussed stone prevention diet in setting of RYGB which likely disrupts her oxalate metabolism. She drinks over 3L of water a day which is good. She needs to work on adhering to low oxalate diet    Re: kidney stones, no significant symptom aside from presumably being the source of gross hematuria. Had a bad experience with last ureteroscopy so I initially discussed ESWL, but there is a significantly lower stone free rate than repeat ureteroscopy. Thus I discussed ureteroscopic management with laser lithotripsy and stone basketing with stent placement. Risks including need for secondary procedure, infection, ureteral injury, incomplete stone clearance, stent pain and irritation were discussed. She agrees to proceed    At time of ureteroscopy will assess bladder to make sure there is no other concerning source of gross hematuria    Re: recurrent UTI, recently treated by her PCP, need to get urine culture results (currently on cipro).  Would likely benefit from starting vaginal estrogen in the future      PLAN:   Schedule right ureteroscopy with laser lithotripsy and stone basketing, right retrograde pyelogram, right ureteral stent placement    Curtis Gillis MD   [facility]  [phone] clinic phone        Virtual Visit Details    Type of service:  Video Visit   Video Start Time: 11:42am  Video End Time:11:56am    Originating Location (pt. Location): Home    Distant Location (provider  location):  On-site  Platform used for Video Visit: [facility]

## 2025-05-05 RX ORDER — SUMATRIPTAN 50 MG/1
TABLET, FILM COATED ORAL
COMMUNITY

## 2025-05-09 ENCOUNTER — APPOINTMENT (OUTPATIENT)
Dept: GENERAL RADIOLOGY | Facility: CLINIC | Age: 55
End: 2025-05-09
Attending: STUDENT IN AN ORGANIZED HEALTH CARE EDUCATION/TRAINING PROGRAM
Payer: MEDICARE

## 2025-05-09 ENCOUNTER — HOSPITAL ENCOUNTER (OUTPATIENT)
Facility: CLINIC | Age: 55
Discharge: HOME | End: 2025-05-09
Attending: STUDENT IN AN ORGANIZED HEALTH CARE EDUCATION/TRAINING PROGRAM | Admitting: STUDENT IN AN ORGANIZED HEALTH CARE EDUCATION/TRAINING PROGRAM
Payer: MEDICARE

## 2025-05-09 VITALS
DIASTOLIC BLOOD PRESSURE: 98 MMHG | BODY MASS INDEX: 26.57 KG/M2 | OXYGEN SATURATION: 98 % | RESPIRATION RATE: 15 BRPM | TEMPERATURE: 97.2 F | SYSTOLIC BLOOD PRESSURE: 152 MMHG | HEIGHT: 67 IN | HEART RATE: 86 BPM | WEIGHT: 169.3 LBS

## 2025-05-09 DIAGNOSIS — N20.0 RIGHT KIDNEY STONE: Primary | ICD-10-CM

## 2025-05-09 LAB
GLUCOSE BLDC GLUCOMTR-MCNC: 142 MG/DL (ref 70–99)
GLUCOSE BLDC GLUCOMTR-MCNC: 158 MG/DL (ref 70–99)

## 2025-05-09 PROCEDURE — C1894 INTRO/SHEATH, NON-LASER: HCPCS | Performed by: STUDENT IN AN ORGANIZED HEALTH CARE EDUCATION/TRAINING PROGRAM

## 2025-05-09 PROCEDURE — 74420 UROGRAPHY RTRGR +-KUB: CPT | Mod: 26 | Performed by: STUDENT IN AN ORGANIZED HEALTH CARE EDUCATION/TRAINING PROGRAM

## 2025-05-09 PROCEDURE — 258N000003 HC RX IP 258 OP 636: Performed by: ANESTHESIOLOGY

## 2025-05-09 PROCEDURE — C1769 GUIDE WIRE: HCPCS | Performed by: STUDENT IN AN ORGANIZED HEALTH CARE EDUCATION/TRAINING PROGRAM

## 2025-05-09 PROCEDURE — 250N000025 HC SEVOFLURANE, PER MIN: Performed by: STUDENT IN AN ORGANIZED HEALTH CARE EDUCATION/TRAINING PROGRAM

## 2025-05-09 PROCEDURE — 710N000009 HC RECOVERY PHASE 1, LEVEL 1, PER MIN: Performed by: STUDENT IN AN ORGANIZED HEALTH CARE EDUCATION/TRAINING PROGRAM

## 2025-05-09 PROCEDURE — 255N000002 HC RX 255 OP 636: Performed by: STUDENT IN AN ORGANIZED HEALTH CARE EDUCATION/TRAINING PROGRAM

## 2025-05-09 PROCEDURE — 52356 CYSTO/URETERO W/LITHOTRIPSY: CPT | Mod: RT | Performed by: STUDENT IN AN ORGANIZED HEALTH CARE EDUCATION/TRAINING PROGRAM

## 2025-05-09 PROCEDURE — 82962 GLUCOSE BLOOD TEST: CPT

## 2025-05-09 PROCEDURE — 250N000013 HC RX MED GY IP 250 OP 250 PS 637: Performed by: ANESTHESIOLOGY

## 2025-05-09 PROCEDURE — 250N000009 HC RX 250: Performed by: STUDENT IN AN ORGANIZED HEALTH CARE EDUCATION/TRAINING PROGRAM

## 2025-05-09 PROCEDURE — C2617 STENT, NON-COR, TEM W/O DEL: HCPCS | Performed by: STUDENT IN AN ORGANIZED HEALTH CARE EDUCATION/TRAINING PROGRAM

## 2025-05-09 PROCEDURE — 999N000141 HC STATISTIC PRE-PROCEDURE NURSING ASSESSMENT: Performed by: STUDENT IN AN ORGANIZED HEALTH CARE EDUCATION/TRAINING PROGRAM

## 2025-05-09 PROCEDURE — 258N000001 HC RX 258: Performed by: STUDENT IN AN ORGANIZED HEALTH CARE EDUCATION/TRAINING PROGRAM

## 2025-05-09 PROCEDURE — C1758 CATHETER, URETERAL: HCPCS | Performed by: STUDENT IN AN ORGANIZED HEALTH CARE EDUCATION/TRAINING PROGRAM

## 2025-05-09 PROCEDURE — 360N000084 HC SURGERY LEVEL 4 W/ FLUORO, PER MIN: Performed by: STUDENT IN AN ORGANIZED HEALTH CARE EDUCATION/TRAINING PROGRAM

## 2025-05-09 PROCEDURE — 999N000179 XR SURGERY CARM FLUORO LESS THAN 5 MIN W STILLS

## 2025-05-09 PROCEDURE — 82365 CALCULUS SPECTROSCOPY: CPT | Performed by: STUDENT IN AN ORGANIZED HEALTH CARE EDUCATION/TRAINING PROGRAM

## 2025-05-09 PROCEDURE — 272N000001 HC OR GENERAL SUPPLY STERILE: Performed by: STUDENT IN AN ORGANIZED HEALTH CARE EDUCATION/TRAINING PROGRAM

## 2025-05-09 PROCEDURE — 250N000013 HC RX MED GY IP 250 OP 250 PS 637: Performed by: STUDENT IN AN ORGANIZED HEALTH CARE EDUCATION/TRAINING PROGRAM

## 2025-05-09 PROCEDURE — 710N000012 HC RECOVERY PHASE 2, PER MINUTE: Performed by: STUDENT IN AN ORGANIZED HEALTH CARE EDUCATION/TRAINING PROGRAM

## 2025-05-09 PROCEDURE — 370N000017 HC ANESTHESIA TECHNICAL FEE, PER MIN: Performed by: STUDENT IN AN ORGANIZED HEALTH CARE EDUCATION/TRAINING PROGRAM

## 2025-05-09 DEVICE — URETERAL STENT
Type: IMPLANTABLE DEVICE | Site: ABDOMEN | Status: FUNCTIONAL
Brand: POLARIS™ ULTRA

## 2025-05-09 RX ORDER — DEXAMETHASONE SODIUM PHOSPHATE 4 MG/ML
4 INJECTION, SOLUTION INTRA-ARTICULAR; INTRALESIONAL; INTRAMUSCULAR; INTRAVENOUS; SOFT TISSUE
Status: DISCONTINUED | OUTPATIENT
Start: 2025-05-09 | End: 2025-05-09 | Stop reason: HOSPADM

## 2025-05-09 RX ORDER — FENTANYL CITRATE 50 UG/ML
50 INJECTION, SOLUTION INTRAMUSCULAR; INTRAVENOUS EVERY 5 MIN PRN
Status: DISCONTINUED | OUTPATIENT
Start: 2025-05-09 | End: 2025-05-09 | Stop reason: HOSPADM

## 2025-05-09 RX ORDER — ONDANSETRON 4 MG/1
4 TABLET, ORALLY DISINTEGRATING ORAL EVERY 30 MIN PRN
Status: DISCONTINUED | OUTPATIENT
Start: 2025-05-09 | End: 2025-05-09 | Stop reason: HOSPADM

## 2025-05-09 RX ORDER — CIPROFLOXACIN 500 MG/1
500 TABLET, FILM COATED ORAL ONCE
Qty: 1 TABLET | Refills: 0 | Status: SHIPPED | OUTPATIENT
Start: 2025-05-14 | End: 2025-05-14

## 2025-05-09 RX ORDER — OXYCODONE HYDROCHLORIDE 5 MG/1
10 TABLET ORAL
Status: DISCONTINUED | OUTPATIENT
Start: 2025-05-09 | End: 2025-05-09 | Stop reason: HOSPADM

## 2025-05-09 RX ORDER — SODIUM CHLORIDE, SODIUM LACTATE, POTASSIUM CHLORIDE, CALCIUM CHLORIDE 600; 310; 30; 20 MG/100ML; MG/100ML; MG/100ML; MG/100ML
INJECTION, SOLUTION INTRAVENOUS CONTINUOUS
Status: DISCONTINUED | OUTPATIENT
Start: 2025-05-09 | End: 2025-05-09 | Stop reason: HOSPADM

## 2025-05-09 RX ORDER — DOCUSATE SODIUM 100 MG/1
100 CAPSULE, LIQUID FILLED ORAL 2 TIMES DAILY
Qty: 30 CAPSULE | Refills: 0 | Status: SHIPPED | OUTPATIENT
Start: 2025-05-09

## 2025-05-09 RX ORDER — CEFAZOLIN SODIUM/WATER 2 G/20 ML
2 SYRINGE (ML) INTRAVENOUS SEE ADMIN INSTRUCTIONS
Status: DISCONTINUED | OUTPATIENT
Start: 2025-05-09 | End: 2025-05-09 | Stop reason: HOSPADM

## 2025-05-09 RX ORDER — CEFAZOLIN SODIUM/WATER 2 G/20 ML
2 SYRINGE (ML) INTRAVENOUS
Status: COMPLETED | OUTPATIENT
Start: 2025-05-09 | End: 2025-05-09

## 2025-05-09 RX ORDER — ACETAMINOPHEN 325 MG/1
975 TABLET ORAL ONCE
Status: COMPLETED | OUTPATIENT
Start: 2025-05-09 | End: 2025-05-09

## 2025-05-09 RX ORDER — LIDOCAINE 40 MG/G
CREAM TOPICAL
Status: DISCONTINUED | OUTPATIENT
Start: 2025-05-09 | End: 2025-05-09 | Stop reason: HOSPADM

## 2025-05-09 RX ORDER — NALOXONE HYDROCHLORIDE 0.4 MG/ML
0.1 INJECTION, SOLUTION INTRAMUSCULAR; INTRAVENOUS; SUBCUTANEOUS
Status: DISCONTINUED | OUTPATIENT
Start: 2025-05-09 | End: 2025-05-09 | Stop reason: HOSPADM

## 2025-05-09 RX ORDER — ACETAMINOPHEN 500 MG
1000 TABLET ORAL EVERY 6 HOURS PRN
Qty: 100 TABLET | Refills: 0 | Status: SHIPPED | OUTPATIENT
Start: 2025-05-09 | End: 2025-05-09

## 2025-05-09 RX ORDER — FENTANYL CITRATE 50 UG/ML
25 INJECTION, SOLUTION INTRAMUSCULAR; INTRAVENOUS EVERY 5 MIN PRN
Status: DISCONTINUED | OUTPATIENT
Start: 2025-05-09 | End: 2025-05-09 | Stop reason: HOSPADM

## 2025-05-09 RX ORDER — ACETAMINOPHEN 650 MG/1
650 SUPPOSITORY RECTAL ONCE
Status: COMPLETED | OUTPATIENT
Start: 2025-05-09 | End: 2025-05-09

## 2025-05-09 RX ORDER — ONDANSETRON 2 MG/ML
4 INJECTION INTRAMUSCULAR; INTRAVENOUS EVERY 30 MIN PRN
Status: DISCONTINUED | OUTPATIENT
Start: 2025-05-09 | End: 2025-05-09 | Stop reason: HOSPADM

## 2025-05-09 RX ORDER — OXYCODONE HYDROCHLORIDE 5 MG/1
5 TABLET ORAL EVERY 6 HOURS PRN
Qty: 6 TABLET | Refills: 0 | Status: SHIPPED | OUTPATIENT
Start: 2025-05-09 | End: 2025-05-09

## 2025-05-09 RX ORDER — HYDROMORPHONE HCL IN WATER/PF 6 MG/30 ML
0.4 PATIENT CONTROLLED ANALGESIA SYRINGE INTRAVENOUS EVERY 5 MIN PRN
Status: DISCONTINUED | OUTPATIENT
Start: 2025-05-09 | End: 2025-05-09 | Stop reason: HOSPADM

## 2025-05-09 RX ORDER — CLOTRIMAZOLE 10 MG/1
LOZENGE ORAL
COMMUNITY
Start: 2025-05-06

## 2025-05-09 RX ORDER — OXYCODONE HYDROCHLORIDE 5 MG/1
5 TABLET ORAL
Status: COMPLETED | OUTPATIENT
Start: 2025-05-09 | End: 2025-05-09

## 2025-05-09 RX ORDER — TAMSULOSIN HYDROCHLORIDE 0.4 MG/1
0.4 CAPSULE ORAL DAILY
Qty: 5 CAPSULE | Refills: 0 | Status: SHIPPED | OUTPATIENT
Start: 2025-05-09

## 2025-05-09 RX ORDER — HYDROMORPHONE HCL IN WATER/PF 6 MG/30 ML
0.2 PATIENT CONTROLLED ANALGESIA SYRINGE INTRAVENOUS EVERY 5 MIN PRN
Status: DISCONTINUED | OUTPATIENT
Start: 2025-05-09 | End: 2025-05-09 | Stop reason: HOSPADM

## 2025-05-09 RX ORDER — HYDROCODONE BITARTRATE AND ACETAMINOPHEN 5; 325 MG/1; MG/1
1 TABLET ORAL EVERY 6 HOURS PRN
Qty: 10 TABLET | Refills: 0 | Status: SHIPPED | OUTPATIENT
Start: 2025-05-09 | End: 2025-05-12

## 2025-05-09 RX ORDER — CEFDINIR 300 MG/1
CAPSULE ORAL
COMMUNITY
Start: 2025-05-06

## 2025-05-09 RX ADMIN — ACETAMINOPHEN 975 MG: 325 TABLET, FILM COATED ORAL at 09:02

## 2025-05-09 RX ADMIN — SODIUM CHLORIDE, SODIUM LACTATE, POTASSIUM CHLORIDE, AND CALCIUM CHLORIDE: .6; .31; .03; .02 INJECTION, SOLUTION INTRAVENOUS at 09:18

## 2025-05-09 RX ADMIN — OXYCODONE HYDROCHLORIDE 5 MG: 5 TABLET ORAL at 12:26

## 2025-05-09 ASSESSMENT — ACTIVITIES OF DAILY LIVING (ADL)
ADLS_ACUITY_SCORE: 15

## 2025-05-09 NOTE — OP NOTE
[facility]  Operative Note    Pre-operative diagnosis: Calculus of kidney [N20.0]  Gross hematuria [R31.0]   Post-operative diagnosis Calculus of kidney [N20.0]  Gross hematuria [R31.0]   Procedure: Procedure(s):  Cystoscopy, right ureteroscopy with laser lithotripsy and stone basketing, right retrograde pyelogram, right ureteral stent placement  Fluoroscopic interpretation <1 hour physician tie   Surgeon: Curtis Gillis MD   Assistants(s): none   Anesthesia: General    Estimated blood loss: Minimal    Total IV fluids: (See anesthesia record)   Blood transfusion: No transfusion was given during surgery   Total urine output: Not measured   Drains: Right ureteral stent on a dangle   Specimens: ID Type Source Tests Collected by Time Destination   A : right kidney stones Calculus/Stone Kidney, Right STONE ANALYSIS Curtis Gillis MD 5/9/2025 10:47 AM         Implants: Implant Name Type Inv. Item Serial No.  Lot No. LRB No. Used Action   STENT URETERAL POLARIS ULTRA [id number] - [id number] Stent STENT URETERAL POLARIS ULTRA [id number]  [employer] [id number] Right 1 Implanted          Findings:   No obvious bladder tumors or raised erythema  8 mm right renal pelvis stone, laser dusted/fragmented/basketed, 90% stone free  No obvious upper pole stone located, presumably embedded   Complications: None.   Condition: Stable               Description of procedure:  54-year-old female with history of Seema-en-Y gastric bypass, current urinary tract infection, nephrolithiasis most recently status post bilateral ureteroscopy 2023 now with intermittent gross hematuria and found to have right renal stones.  She presents today for ureteroscopic management. I discussed ureteroscopic management with laser lithotripsy and stone basketing with stent placement. Risks including need for secondary procedure, infection, ureteral injury, incomplete stone clearance, stent pain  and irritation were discussed.  Informed consent was obtained.    The patient was brought to operating room #14.  Preop antibiotics were given prior to the procedure.  General anesthesia was induced, she was placed in dorsolithotomy position, prepped and draped in standard sterile fashion.  A timeout was performed.    A 22 Kinyarwanda Stortz cystoscope was assembled and passed through the urethra and into the bladder.  No obvious bladder tumors or raised erythema were found.  She had bilateral single orthotopic ureteral orifices.  The right ureteral orifice was cannulated with a tiger tail catheter.   film showed a shadowing radiodensity over the right renal pelvis consistent with stone.  Gentle retrograde pyelogram showed mild hydronephrosis.  A Glidewire was passed up to the renal pelvis under fluoroscopic guidance and the tiger tail catheter was removed.  The scope was removed and the wire was clipped to the drapes as a safety wire.  A Storz semirigid ureteroscope was assembled and passed through the urethra, into the bladder and up the right ureter.  The ureter was widely patent all the way up to the ureteropelvic junction.  A second wire was passed through the scope up into the renal pelvis and the scope was backloaded off the wire.  Over the working wire a [employer] navigator 11/13 Kinyarwanda by 36 cm ureteral access sheath was passed up to the proximal ureter under fluoroscopic guidance and the obturator and wire were removed.  An Olympus digital flexible ureteroscope was passed through the access sheath up to the renal pelvis.  Complete pyeloscopy revealed a single 8 mm right renal pelvis stone.  There was no other stone located.  I reviewed the CT scan intraoperatively and carefully examined the upper pole.  Specifically there were no upper pole stones.  The Olympus Soltive thulium fiber laser was used to dust the renal pelvis stone.  Large stone fragments were basketed out using a 0 tip basket.   Estimate 90% stone free with remainder of the stone burden consisting of dust less than 1 mm in diameter which should pass on its own.  A repeat retrograde pyelogram was performed to provide a landing zone for the stent.  Pullout ureteroscopy revealed the ureter to be in good condition with no tears or lacerations.  The ureteroscope and access sheath were removed.  A stent was placed in the usual fashion under cystoscopic and fluoroscopic guidance with good curls noted proximally and distally    She can remove the stent on her own on POD#5. She should then have followup for metabolic stone analysis and prevention    Curtis Gillis MD   [facility]  [phone] clinic phone

## 2025-05-09 NOTE — OR NURSING
Notified Dr. Glass [facility] of Blood pressure 176/115. Preop blood pressures 197/124. Patient states normally takes home blood pressure meds but today was instructed not to. Dr. Glass said not to treat in PACU, ok to discharge just as long as takes home medications when gets home.

## 2025-05-09 NOTE — DISCHARGE INSTRUCTIONS
POSTOPERATIVE INSTRUCTIONS    Diagnosis-------------------------------   Right kidney stone    Procedure-------------------------------  Procedure(s) (LRB):  right ureteroscopy with laser lithotripsy and stone basketing, right retrograde pyelogram, right ureteral stent placement (Right)      Findings--------------------------------  No obvious bladder tumors or raised erythema  8 mm right renal pelvis stone, laser dusted/fragmented/basketed, 90% stone free  No obvious upper pole stone located, presumably embedded    Home-going instructions-----------------         Activity Limitation:     - No driving or operating heavy machinery while on narcotic pain medication.     FOLLOW THESE INSTRUCTIONS AS INDICATED BELOW:  - Observe operative area for signs of excessive bleeding.  - You may shower.  - Increase fluid intake to promote clear urine.  - Resume usual diet as tolerated    What to expect while recovering-----------  - You may experience some intermittent bleeding that makes your urine pink or cherry colored. This is normal.  - However, if you are unable to urinate, passing large amount of clots, have pauly blood in your urine, or have a temperature >101 degrees, call the urology nurse on call, or present to your nearest emergency department.  - You are encouraged to walk daily, and have no activity restrictions.   - A URETERAL STENT has been placed that allows urine to flow unobstructed from your kidney into your bladder.  The stent has a curl in the kidney and a curl in the bladder.  The curl in the bladder can cause some urgency and frequency of urination as well as some mild blood in the urine.  The curl in the kidney can cause some mild flank discomfort.  This may be more noticeable when you urinate.  A URETERAL STENT is meant to be left in temporarily.  It must be removed or changed no later than 3 months after its insertion.  If it's not removed it can result in stone overgrowth on the stent that can cause  pain, infection, and can be very difficult to remove.      STENT REMOVAL  On Wednesday May 14 morning take your prescribed ciprofloxacin antibiotic. Then grasp the black string which is protruding from your urethra. With a smooth slow and gentle pull, remove the string. Attached to it should be a blue and white plastic tube with curls on both ends. If you pull the black string and only the string comes out, you will need to contact the office to schedule a cystoscopy and stent removal.     Discharge Medications/instructions:   - Flomax (tamsulosin) to be taken daily until stent is removed  - Antibiotics (ciprofloxacin) are to be taken within one hour of stent removal  - Take Tylenol 1000mg every 6 hours for pain  - Take Oxycodone 5mg every 6 hours only for break through pain  - Take Colace while taking Oxycodone to prevent constipation      Questions/concerns------------------------  [facility]: [phone]    Future appointments  We will cancel your follow up appointment with me    Follow up in 2-3 months with renal ultrasound, litholink x2, for stone prevention    Curtis Gillis MD   Maximum acetaminophen (Tylenol) dose from all sources should not exceed 4 grams (4000 mg) per day. Last dose at 0900 am. Do not take any more acetaminophen until 3 pm.

## 2025-05-12 ENCOUNTER — TELEPHONE (OUTPATIENT)
Dept: UROLOGY | Facility: CLINIC | Age: 55
End: 2025-05-12
Payer: MEDICARE

## 2025-05-12 NOTE — TELEPHONE ENCOUNTER
"Pt called nurse triage line. Call was transferred to me.  5/9 right ureteroscopy with laser lithotripsy and stone basketing, right retrograde pyelogram, right ureteral stent placement     POD# 3  Pt reports Abd pain and \"swelling\" (She means distention) with nausea. and R flank pain. Describing intermittent \"squeezing\" pain in flank and abd. Afebrile.  She decided to remove her ureteral stent this morning at 7AM. (Was not supposed to be removed for 3 more days).     I asked her why she removed the stent, she said, \"Because my abdomen was swollen/distended.\" She reports the abd distention has lessened since stent removal, but still present. She reports she has been have regular BM's and no chance she is constipated.     She is requesting pain and nausea medicine. I discussed with other nurses in my office, it is common for a person to have ureteral spasms post stent removal for 1-2 hours, not 6 hours after. Advised pt to go to ER.   She said she's going to wait it out for a few more hours.         "

## 2025-05-12 NOTE — TELEPHONE ENCOUNTER
Patient called nurse line and LM. Returned phone call and spoke with patient. She reports that she removed stent on her own today. She also reports that she is having a lot of pain now that stent is removed and Oxycodone is not helping. Patient said that she removed her stent due to distention in her abdomen. She also reports that she is having some nausea. Forwarded patient to RN Care coordinator to further triage.     Sasha Castillo LPN

## 2025-05-13 ENCOUNTER — HOSPITAL ENCOUNTER (EMERGENCY)
Facility: CLINIC | Age: 55
Discharge: HOME | End: 2025-05-13
Attending: STUDENT IN AN ORGANIZED HEALTH CARE EDUCATION/TRAINING PROGRAM | Admitting: STUDENT IN AN ORGANIZED HEALTH CARE EDUCATION/TRAINING PROGRAM
Payer: MEDICARE

## 2025-05-13 VITALS
TEMPERATURE: 99.8 F | DIASTOLIC BLOOD PRESSURE: 95 MMHG | OXYGEN SATURATION: 100 % | RESPIRATION RATE: 16 BRPM | SYSTOLIC BLOOD PRESSURE: 156 MMHG | HEART RATE: 98 BPM

## 2025-05-13 DIAGNOSIS — T83.84XA PAIN DUE TO URETERAL STENT, INITIAL ENCOUNTER: ICD-10-CM

## 2025-05-13 LAB
ALBUMIN UR-MCNC: 100 MG/DL
ALBUMIN UR-MCNC: 50 MG/DL
APPEARANCE UR: ABNORMAL
APPEARANCE UR: ABNORMAL
BACTERIA #/AREA URNS HPF: ABNORMAL /HPF
BILIRUB UR QL STRIP: NEGATIVE
BILIRUB UR QL STRIP: NEGATIVE
COLOR UR AUTO: YELLOW
COLOR UR AUTO: YELLOW
GLUCOSE UR STRIP-MCNC: 50 MG/DL
GLUCOSE UR STRIP-MCNC: NEGATIVE MG/DL
HGB UR QL STRIP: ABNORMAL
HGB UR QL STRIP: ABNORMAL
KETONES UR STRIP-MCNC: 10 MG/DL
KETONES UR STRIP-MCNC: 40 MG/DL
LEUKOCYTE ESTERASE UR QL STRIP: ABNORMAL
LEUKOCYTE ESTERASE UR QL STRIP: ABNORMAL
MUCOUS THREADS #/AREA URNS LPF: PRESENT /LPF
MUCOUS THREADS #/AREA URNS LPF: PRESENT /LPF
NITRATE UR QL: NEGATIVE
NITRATE UR QL: NEGATIVE
PH UR STRIP: 5.5 [PH] (ref 5–7)
PH UR STRIP: 6 [PH] (ref 5–7)
RBC URINE: 52 /HPF
RBC URINE: 56 /HPF
SP GR UR STRIP: 1.01 (ref 1–1.03)
SP GR UR STRIP: 1.03 (ref 1–1.03)
SQUAMOUS EPITHELIAL: 19 /HPF
SQUAMOUS EPITHELIAL: 6 /HPF
UROBILINOGEN UR STRIP-MCNC: NORMAL MG/DL
UROBILINOGEN UR STRIP-MCNC: NORMAL MG/DL
WBC URINE: >182 /HPF
WBC URINE: >182 /HPF

## 2025-05-13 PROCEDURE — 96375 TX/PRO/DX INJ NEW DRUG ADDON: CPT

## 2025-05-13 PROCEDURE — 250N000011 HC RX IP 250 OP 636: Mod: JZ | Performed by: STUDENT IN AN ORGANIZED HEALTH CARE EDUCATION/TRAINING PROGRAM

## 2025-05-13 PROCEDURE — 81001 URINALYSIS AUTO W/SCOPE: CPT | Performed by: STUDENT IN AN ORGANIZED HEALTH CARE EDUCATION/TRAINING PROGRAM

## 2025-05-13 PROCEDURE — 258N000003 HC RX IP 258 OP 636: Performed by: STUDENT IN AN ORGANIZED HEALTH CARE EDUCATION/TRAINING PROGRAM

## 2025-05-13 PROCEDURE — 250N000013 HC RX MED GY IP 250 OP 250 PS 637: Performed by: STUDENT IN AN ORGANIZED HEALTH CARE EDUCATION/TRAINING PROGRAM

## 2025-05-13 PROCEDURE — 96374 THER/PROPH/DIAG INJ IV PUSH: CPT

## 2025-05-13 PROCEDURE — 87086 URINE CULTURE/COLONY COUNT: CPT | Performed by: STUDENT IN AN ORGANIZED HEALTH CARE EDUCATION/TRAINING PROGRAM

## 2025-05-13 PROCEDURE — 99284 EMERGENCY DEPT VISIT MOD MDM: CPT | Mod: 25

## 2025-05-13 RX ORDER — ONDANSETRON 4 MG/1
4 TABLET, ORALLY DISINTEGRATING ORAL EVERY 8 HOURS PRN
Qty: 10 TABLET | Refills: 0 | Status: SHIPPED | OUTPATIENT
Start: 2025-05-13 | End: 2025-05-16

## 2025-05-13 RX ORDER — ONDANSETRON 2 MG/ML
4 INJECTION INTRAMUSCULAR; INTRAVENOUS ONCE
Status: COMPLETED | OUTPATIENT
Start: 2025-05-13 | End: 2025-05-13

## 2025-05-13 RX ORDER — KETOROLAC TROMETHAMINE 15 MG/ML
15 INJECTION, SOLUTION INTRAMUSCULAR; INTRAVENOUS ONCE
Status: COMPLETED | OUTPATIENT
Start: 2025-05-13 | End: 2025-05-13

## 2025-05-13 RX ORDER — DICYCLOMINE HCL 20 MG
20 TABLET ORAL ONCE
Status: COMPLETED | OUTPATIENT
Start: 2025-05-13 | End: 2025-05-13

## 2025-05-13 RX ORDER — MORPHINE SULFATE 4 MG/ML
4 INJECTION, SOLUTION INTRAMUSCULAR; INTRAVENOUS ONCE
Refills: 0 | Status: COMPLETED | OUTPATIENT
Start: 2025-05-13 | End: 2025-05-13

## 2025-05-13 RX ORDER — OXYCODONE HYDROCHLORIDE 5 MG/1
5 TABLET ORAL EVERY 6 HOURS PRN
Qty: 12 TABLET | Refills: 0 | Status: SHIPPED | OUTPATIENT
Start: 2025-05-13 | End: 2025-05-16

## 2025-05-13 RX ORDER — DICYCLOMINE HCL 20 MG
20 TABLET ORAL 2 TIMES DAILY
Qty: 20 TABLET | Refills: 0 | Status: SHIPPED | OUTPATIENT
Start: 2025-05-13 | End: 2025-05-23

## 2025-05-13 RX ORDER — OXYBUTYNIN CHLORIDE 5 MG/1
10 TABLET ORAL ONCE
Status: COMPLETED | OUTPATIENT
Start: 2025-05-13 | End: 2025-05-13

## 2025-05-13 RX ADMIN — SODIUM CHLORIDE 1000 ML: 9 INJECTION, SOLUTION INTRAVENOUS at 03:43

## 2025-05-13 RX ADMIN — ONDANSETRON 4 MG: 2 INJECTION, SOLUTION INTRAMUSCULAR; INTRAVENOUS at 02:28

## 2025-05-13 RX ADMIN — KETOROLAC TROMETHAMINE 15 MG: 15 INJECTION, SOLUTION INTRAMUSCULAR; INTRAVENOUS at 02:28

## 2025-05-13 RX ADMIN — OXYBUTYNIN CHLORIDE 10 MG: 5 TABLET ORAL at 02:29

## 2025-05-13 RX ADMIN — MORPHINE SULFATE 4 MG: 4 INJECTION, SOLUTION INTRAMUSCULAR; INTRAVENOUS at 02:28

## 2025-05-13 RX ADMIN — DICYCLOMINE HYDROCHLORIDE 20 MG: 20 TABLET ORAL at 02:28

## 2025-05-13 ASSESSMENT — ACTIVITIES OF DAILY LIVING (ADL)
ADLS_ACUITY_SCORE: 42

## 2025-05-13 ASSESSMENT — COLUMBIA-SUICIDE SEVERITY RATING SCALE - C-SSRS
6. HAVE YOU EVER DONE ANYTHING, STARTED TO DO ANYTHING, OR PREPARED TO DO ANYTHING TO END YOUR LIFE?: NO
2. HAVE YOU ACTUALLY HAD ANY THOUGHTS OF KILLING YOURSELF IN THE PAST MONTH?: NO
1. IN THE PAST MONTH, HAVE YOU WISHED YOU WERE DEAD OR WISHED YOU COULD GO TO SLEEP AND NOT WAKE UP?: NO

## 2025-05-13 NOTE — ED PROVIDER NOTES
Emergency Department Note      History of Present Illness     Chief Complaint   Vomiting and Post-op Problem      HPI   Jaylene Dougherty is a 54 year old female status post right ureteroscopy, laser lithotripsy and ureteral stent placement on May 9, presents with right flank pain, nausea, and vomiting.  History of gastric bypass.  Patient has been having pain in her right flank for last 2 days.  Urinating frequently but no dysuria.  No difficulty stooling.  She removed the stent today earlier than expected-was supposed to remove at 5 days, because she was having continued irritation. Felt feverish, but did not take temperature.    Independent Historian   None    Review of External Notes   May 9, 2025-reviewed operative note for right ureteroscopy with laser lithotripsy and ureteral stent placement.    Past Medical History     Medical History and Problem List   Past Medical History:   Diagnosis Date    Hypertension        Medications   dicyclomine (BENTYL) 20 MG tablet  ondansetron (ZOFRAN ODT) 4 MG ODT tab  oxyCODONE (ROXICODONE) 5 MG tablet  carvedilol (COREG) 6.25 MG tablet  cefdinir (OMNICEF) 300 MG capsule  [START ON 5/14/2025] ciprofloxacin (CIPRO) 500 MG tablet  clotrimazole (MYCELEX) 10 MG lozenge  Continuous Glucose Sensor (DEXCOM G6 SENSOR) MISC  Continuous Glucose Transmitter (DEXCOM G6 TRANSMITTER) MISC  docusate sodium (COLACE) 100 MG capsule  FEROSUL 325 (65 Fe) MG tablet  Insulin Disposable Pump (OMNIPOD 5 PODS, GEN 5,) MISC  lisinopril (ZESTRIL) 10 MG tablet  Multiple Vitamins-Minerals (MULTI VITAMIN  /MINERALS) TABS  nortriptyline (PAMELOR) 25 MG capsule  NOVOLOG VIAL 100 UNIT/ML soln  omeprazole (PRILOSEC) 20 MG DR capsule  rosuvastatin (CRESTOR) 20 MG tablet  SUMAtriptan (IMITREX) 50 MG tablet  tamsulosin (FLOMAX) 0.4 MG capsule  zolpidem (AMBIEN) 10 MG tablet        Surgical History   Past Surgical History:   Procedure Laterality Date    COMBINED CYSTOSCOPY, RETROGRADES, URETEROSCOPY, LASER  HOLMIUM LITHOTRIPSY URETER(S), INSERT STENT Right 5/9/2025    Procedure: right ureteroscopy with laser lithotripsy and stone basketing, right retrograde pyelogram, right ureteral stent placement;  Surgeon: Curtis Gillis MD;  Location:  OR       Physical Exam     Patient Vitals for the past 24 hrs:   BP Temp Temp src Pulse Resp SpO2   05/13/25 0356 -- -- -- 102 -- --   05/13/25 0345 -- 99.8  F (37.7  C) Oral -- -- --   05/13/25 0325 (!) 176/95 -- -- 107 16 99 %     Physical Exam  GENERAL: Patient appears slightly uncomfortable, but nontoxic.  HEAD: Atraumatic.  NECK: No rigidity  CV: RRR, no murmurs, rubs or gallops  PULM: CTAB with good aeration; no retractions, rales, rhonchi, or wheezing  ABD: Soft, nontender, nondistended, no guarding  DERM: No rash. Skin warm and dry  EXTREMITY: Moving all extremities without difficulty.       Diagnostics     Lab Results   Labs Ordered and Resulted from Time of ED Arrival to Time of ED Departure   ROUTINE UA WITH MICROSCOPIC REFLEX TO CULTURE - Abnormal       Result Value    Color Urine Yellow      Appearance Urine Slightly Cloudy (*)     Glucose Urine 50 (*)     Bilirubin Urine Negative      Ketones Urine 10 (*)     Specific Gravity Urine 1.014      Blood Urine Moderate (*)     pH Urine 6.0      Protein Albumin Urine 50 (*)     Urobilinogen Urine Normal      Nitrite Urine Negative      Leukocyte Esterase Urine Large (*)     Bacteria Urine Few (*)     Mucus Urine Present (*)     RBC Urine 52 (*)     WBC Urine >182 (*)     Squamous Epithelials Urine 19 (*)    ROUTINE UA WITH MICROSCOPIC REFLEX TO CULTURE - Abnormal    Color Urine Yellow      Appearance Urine Slightly Cloudy (*)     Glucose Urine Negative      Bilirubin Urine Negative      Ketones Urine 40 (*)     Specific Gravity Urine 1.030      Blood Urine Moderate (*)     pH Urine 5.5      Protein Albumin Urine 100 (*)     Urobilinogen Urine Normal      Nitrite Urine Negative      Leukocyte Esterase Urine Large (*)      Mucus Urine Present (*)     RBC Urine 56 (*)     WBC Urine >182 (*)     Squamous Epithelials Urine 6 (*)    URINE CULTURE   URINE CULTURE       Imaging   No orders to display       ED Course      Medications Administered   Medications   sodium chloride 0.9% BOLUS 1,000 mL (1,000 mLs Intravenous $New Bag 5/13/25 0343)   oxyBUTYnin (DITROPAN) tablet 10 mg (10 mg Oral $Given 5/13/25 0229)   morphine (PF) injection 4 mg (4 mg Intravenous $Given 5/13/25 0228)   ondansetron (ZOFRAN) injection 4 mg (4 mg Intravenous $Given 5/13/25 0228)   ketorolac (TORADOL) injection 15 mg (15 mg Intravenous $Given 5/13/25 0228)   dicyclomine (BENTYL) tablet 20 mg (20 mg Oral $Given 5/13/25 0228)       Procedures   Procedures     Discussion of Management   Discussed with on-call Urology Dr. Haddad    ED Course        Additional Documentation  None    Medical Decision Making / Diagnosis     CMS Diagnoses: None    MIPS            None    MDM   Jaylene Dougherty is a 54 year old female presenting with pain status post ureteral stent self removal.  Initial arrived tachycardic.  She appeared slightly uncomfortable and nauseous but nontoxic.  I gave her oxybutynin, Bentyl, Toradol, morphine, Zofran, and IV fluids.  Afterward she felt much improved. HR improved.  Urine sample is slightly contaminated - does show white blood cells and large leukocyte esterase.  I discussed with on-call urology and due to recent stent placement/removal, these white blood cells and leukocyte esterase are expected and with recent op notes showing essentially no residual stone burden, repeat CT scan not needed.  Discussed with urology that if the patient feels improved and well appearing, which she is, then she is safe for discharge home.  Patient is feeling much improved after these interventions and feels well to go home.  I will prescribe her oxycodone, Bentyl, and Zofran.  She cannot take NSAIDs due to gastric bypass history.  I have evaluated the patient for  acute medical emergencies and have clinically decided no further acute medical interventions are required. Reassessed multiple times and improving. Patient stable for discharge. All questions answered. Given strict return precautions. Patient content with plan. The differential diagnosis and treatment modalities were discussed thoroughly with the patient. Recommended she contact her Urologist today to coordinate follow-up.      Disposition   The patient was discharged.     Diagnosis     ICD-10-CM    1. Pain due to ureteral stent, initial encounter  T83.84XA            Discharge Medications   New Prescriptions    DICYCLOMINE (BENTYL) 20 MG TABLET    Take 1 tablet (20 mg) by mouth 2 times daily for 10 days.    ONDANSETRON (ZOFRAN ODT) 4 MG ODT TAB    Take 1 tablet (4 mg) by mouth every 8 hours as needed.    OXYCODONE (ROXICODONE) 5 MG TABLET    Take 1 tablet (5 mg) by mouth every 6 hours as needed for pain.         MD Jina Lynn Kevin, MD  05/13/25 0356

## 2025-05-13 NOTE — DISCHARGE INSTRUCTIONS
Return to the emergency department if symptoms are worsening, become concerning, or for any other concerns. Contact your urologist today for follow-up.

## 2025-05-13 NOTE — ED TRIAGE NOTES
Patient arrives from home with N/V and pain after having a R kidney stent removed. Patient states that pain medication she was prescribed is not helping and now she has worsening cramping, nausea, and vomiting. On arrival patient is hypertensive and tachycardic.      Triage Assessment (Adult)       Row Name 05/13/25 0131          Respiratory WDL    Respiratory WDL WDL        Skin Circulation/Temperature WDL    Skin Circulation/Temperature WDL WDL        Cardiac WDL    Cardiac WDL WDL        Peripheral/Neurovascular WDL    Peripheral Neurovascular WDL WDL        Cognitive/Neuro/Behavioral WDL    Cognitive/Neuro/Behavioral WDL WDL

## 2025-05-14 LAB
APPEARANCE STONE: NORMAL
BACTERIA UR CULT: NORMAL
COMPN STONE: NORMAL
SPECIMEN WT: 32 MG

## 2025-05-15 ENCOUNTER — RESULTS FOLLOW-UP (OUTPATIENT)
Dept: SURGERY | Facility: CLINIC | Age: 55
End: 2025-05-15

## 2025-05-15 ENCOUNTER — RESULTS FOLLOW-UP (OUTPATIENT)
Dept: NURSING | Facility: CLINIC | Age: 55
End: 2025-05-15

## 2025-05-18 ENCOUNTER — HEALTH MAINTENANCE LETTER (OUTPATIENT)
Age: 55
End: 2025-05-18

## 2025-06-30 ENCOUNTER — TELEPHONE (OUTPATIENT)
Dept: UROLOGY | Facility: CLINIC | Age: 55
End: 2025-06-30
Payer: MEDICARE

## 2025-06-30 DIAGNOSIS — R31.0 GROSS HEMATURIA: ICD-10-CM

## 2025-06-30 DIAGNOSIS — N20.0 CALCULUS OF KIDNEY: Primary | ICD-10-CM

## 2025-06-30 NOTE — TELEPHONE ENCOUNTER
----- Message from Lesley LEGGETT sent at 6/30/2025  2:30 PM CDT -----  Regarding: FW: cancel appointment with me (stent on string). instead schedule appointment 2-3 months with renal ultrasound, litholink x2, with LISETH  Can you please order renal US? Thanks!  ----- Message -----  From: Curtis Gillis MD  Sent: 5/9/2025  11:51 AM CDT  To: [facility] - Scheduling [facility]  Subject: cancel appointment with me (stent on string)#    cancel appointment with me (stent on string). instead schedule appointment 2-3 months with renal ultrasound, litholink x2, with LISETH

## 2025-07-02 ENCOUNTER — TELEPHONE (OUTPATIENT)
Dept: UROLOGY | Facility: CLINIC | Age: 55
End: 2025-07-02
Payer: MEDICARE

## 2025-07-02 DIAGNOSIS — N20.0 CALCULUS OF KIDNEY: Primary | ICD-10-CM

## 2025-07-02 DIAGNOSIS — N20.0 KIDNEY STONE: Primary | ICD-10-CM

## 2025-07-02 NOTE — TELEPHONE ENCOUNTER
----- Message from Lesley LEGGETT sent at 7/2/2025 10:15 AM CDT -----  Regarding: RE: cancel appointment with me (stent on string). instead schedule appointment 2-3 months with renal ultrasound, litholink x2, with LISETH  Can you add the litholink orders too? Just leaned we can order in epic again now. Thanks!  ----- Message -----  From: Sasha Castillo LPN  Sent: 6/30/2025   2:37 PM CDT  To: Lesley Ham  Subject: FW: cancel appointment with me (stent on str#    Hi, Lesley  Order for Renal US placed.   Thanks!  ~Sasha  ----- Message -----  From: Lesley Ham  Sent: 6/30/2025   2:30 PM CDT  To: [facility]  Subject: FW: cancel appointment with me (stent on str#    Can you please order renal US? Thanks!  ----- Message -----  From: Curtis Gillis MD  Sent: 5/9/2025  11:51 AM CDT  To: [facility] - Scheduling Pool  Subject: cancel appointment with me (stent on string)#    cancel appointment with me (stent on string). instead schedule appointment 2-3 months with renal ultrasound, litholink x2, with LISETH

## 2025-07-02 NOTE — TELEPHONE ENCOUNTER
----- Message from Curtis Gillis sent at 5/9/2025 11:50 AM CDT -----  Regarding: cancel appointment with me (stent on string). instead schedule appointment 2-3 months with renal ultrasound, litholink x2, with LISETH  cancel appointment with me (stent on string). instead schedule appointment 2-3 months with renal ultrasound, litholink x2, with LISETH

## 2025-07-16 ENCOUNTER — TELEPHONE (OUTPATIENT)
Dept: UROLOGY | Facility: CLINIC | Age: 55
End: 2025-07-16
Payer: MEDICARE

## (undated) DEVICE — GUIDEWIRE URO STR STIFF .035"X150CM NITINOL 150NSS35

## (undated) DEVICE — BASKET STONE RETRIEVAL NTNL ZERO TIP 1.9FRX90CM M0063901050

## (undated) DEVICE — PAD CHUX UNDERPAD 30X36" P3036C

## (undated) DEVICE — FIBER LASER 200 UM DISPOSABLE TFL-FBX200S

## (undated) DEVICE — PREP DYNA-HEX 4% CHG SCRUB 4OZ BOTTLE MDS098710

## (undated) DEVICE — GLOVE PROTEXIS BLUE W/NEU-THERA 7.0  2D73EB70

## (undated) DEVICE — SOLUTION WATER 1000ML R5000-01

## (undated) DEVICE — BAG CLEAR TRASH 1.3M 39X33" P4040C

## (undated) DEVICE — TUBING IRR LG BORE TUBE DRIP CHMBR 2 BG 94IN 313003

## (undated) DEVICE — SHEATH URETERAL ACCESS NAVIGATOR HD 11/13FRX36CM M0062502220

## (undated) DEVICE — GLOVE PROTEXIS W/NEU-THERA 7.0  2D73TE70

## (undated) DEVICE — PACK CYSTO CUSTOM RIDGES

## (undated) DEVICE — SOLUTION IV IRRIGATION 0.9% NACL 3L R8206

## (undated) DEVICE — CONTRAST ISOVUE 300 61% IOPAMIDOL 10X30ML VIAL 131525

## (undated) DEVICE — LINEN FULL SHEET 5511

## (undated) DEVICE — LINEN HALF SHEET 5512

## (undated) DEVICE — COVER FOOTSWITCH W/CINCH 20X24" 923267

## (undated) DEVICE — CATH URETERAL FLEX TIP TIGERTAIL 06FRX70CM 139006

## (undated) RX ORDER — DEXAMETHASONE SODIUM PHOSPHATE 4 MG/ML
INJECTION, SOLUTION INTRA-ARTICULAR; INTRALESIONAL; INTRAMUSCULAR; INTRAVENOUS; SOFT TISSUE
Status: DISPENSED
Start: 2025-05-09

## (undated) RX ORDER — LIDOCAINE HYDROCHLORIDE 10 MG/ML
INJECTION, SOLUTION EPIDURAL; INFILTRATION; INTRACAUDAL; PERINEURAL
Status: DISPENSED
Start: 2025-05-09

## (undated) RX ORDER — CEFAZOLIN SODIUM/WATER 2 G/20 ML
SYRINGE (ML) INTRAVENOUS
Status: DISPENSED
Start: 2025-05-09

## (undated) RX ORDER — PROPOFOL 10 MG/ML
INJECTION, EMULSION INTRAVENOUS
Status: DISPENSED
Start: 2025-05-09

## (undated) RX ORDER — FENTANYL CITRATE 50 UG/ML
INJECTION, SOLUTION INTRAMUSCULAR; INTRAVENOUS
Status: DISPENSED
Start: 2025-05-09

## (undated) RX ORDER — ACETAMINOPHEN 325 MG/1
TABLET ORAL
Status: DISPENSED
Start: 2025-05-09

## (undated) RX ORDER — OXYCODONE HYDROCHLORIDE 5 MG/1
TABLET ORAL
Status: DISPENSED
Start: 2025-05-09

## (undated) RX ORDER — ONDANSETRON 2 MG/ML
INJECTION INTRAMUSCULAR; INTRAVENOUS
Status: DISPENSED
Start: 2025-05-09